# Patient Record
(demographics unavailable — no encounter records)

---

## 2024-11-01 NOTE — HISTORY OF PRESENT ILLNESS
[FreeTextEntry1] : Mr. Singer is a 30-year-old gentleman who has seen me previously for bright red blood per rectum.  Physical exam is suggested hemorrhoid issues however the hemorrhoids have not been dramatic or obvious.  Given the patient's family history of a father who was diagnosed with colon cancer at age 50, I am recommending additional endoscopic evaluation to be sure of potential sources of bright red blood per rectum.  Following a discussion of risks and benefits and written consent obtained, the patient is positioned left lateral decubitus position.  A lubricated flexible endoscope is advanced across the anorectum.  The flexible sigmoidoscope is advanced to 60 cm at the anal verge.  During extubation the lumen of the left colon and sigmoid colon and rectum are circumferentially examined.  There is no evidence of polyps masses inflammation proctitis or other mucosal abnormalities.  Due to patient discomfort the endoscope is not retroflexed in the anorectum.  I have a good view of the anorectum and there again is nonengorged hemorrhoidal tissue without mass or lesion.  The patient tolerated tolerates the procedure without known immediate complication.  I spoke to Brady about the good news on findings today.  He is invited to contact me for any issues related to bright red blood per rectum or anorectal problems as needed in the future.  Otherwise I recommend he obtain a full colonoscopy at age 40.